# Patient Record
Sex: MALE
[De-identification: names, ages, dates, MRNs, and addresses within clinical notes are randomized per-mention and may not be internally consistent; named-entity substitution may affect disease eponyms.]

---

## 2023-02-14 PROBLEM — Z00.00 ENCOUNTER FOR PREVENTIVE HEALTH EXAMINATION: Status: ACTIVE | Noted: 2023-02-14

## 2023-03-03 ENCOUNTER — APPOINTMENT (OUTPATIENT)
Dept: UROLOGY | Facility: CLINIC | Age: 68
End: 2023-03-03
Payer: MEDICARE

## 2023-03-03 VITALS
HEART RATE: 72 BPM | RESPIRATION RATE: 16 BRPM | DIASTOLIC BLOOD PRESSURE: 78 MMHG | TEMPERATURE: 97.5 F | SYSTOLIC BLOOD PRESSURE: 147 MMHG | OXYGEN SATURATION: 98 % | WEIGHT: 178 LBS

## 2023-03-03 PROCEDURE — 99203 OFFICE O/P NEW LOW 30 MIN: CPT

## 2023-03-03 NOTE — PHYSICAL EXAM
[General Appearance - Well Developed] : well developed [General Appearance - Well Nourished] : well nourished [General Appearance - In No Acute Distress] : no acute distress [Edema] : no peripheral edema [Exaggerated Use Of Accessory Muscles For Inspiration] : no accessory muscle use [Abdomen Soft] : soft [Abdomen Tenderness] : non-tender [Costovertebral Angle Tenderness] : no ~M costovertebral angle tenderness [Normal Station and Gait] : the gait and station were normal for the patient's age [No Focal Deficits] : no focal deficits [Oriented To Time, Place, And Person] : oriented to person, place, and time

## 2023-03-03 NOTE — ASSESSMENT
[FreeTextEntry1] : BPH\par --COnt alfuzosin\par \par 5-7mm LP stone. No sx. Discussed surgery vs observation. Recommend obsrevation but pt very anxious about this. Discussed that last KUB did not visualize stone making SWL less likely to be an option. \par --KUB eval.

## 2023-03-03 NOTE — HISTORY OF PRESENT ILLNESS
[FreeTextEntry1] : 66yo gentleman with cc of stones and luts. Previously seen by Dr Abarca. Was placed on alfuzosin. Had been noted to have microscopic hematuria and had CTU with 7mm stone. Had cysto 12/2022 that was negative. Last note in [facility] states stone seen on [facility]. Review of records with [facility] 1/2023 negative. Plan made for [facility] but this was not covered and so pt was sent here. \par \par No personal or family hx of stones. Drinks occasional juice and soda and a lot of water. Used to drink a lot more soda. Drinks 1 cup of coffee in am and 1 in the afternoon. No special diet. \par \par Use of  #[id number]

## 2023-03-10 ENCOUNTER — APPOINTMENT (OUTPATIENT)
Dept: UROLOGY | Facility: CLINIC | Age: 68
End: 2023-03-10
Payer: MEDICARE

## 2023-03-10 DIAGNOSIS — N40.1 BENIGN PROSTATIC HYPERPLASIA WITH LOWER URINARY TRACT SYMPMS: ICD-10-CM

## 2023-03-10 DIAGNOSIS — N20.0 CALCULUS OF KIDNEY: ICD-10-CM

## 2023-03-10 DIAGNOSIS — N13.8 BENIGN PROSTATIC HYPERPLASIA WITH LOWER URINARY TRACT SYMPMS: ICD-10-CM

## 2023-03-10 PROCEDURE — 99214 OFFICE O/P EST MOD 30 MIN: CPT

## 2023-03-10 NOTE — HISTORY OF PRESENT ILLNESS
[FreeTextEntry1] : 66yo gentleman with cc of stones and luts. Previously seen by Dr Abarca. Was placed on alfuzosin. Had been noted to have microscopic hematuria and had CTU with 7mm stone. Had cysto 12/2022 that was negative. Last note in [facility] states stone seen on [facility]. Review of records with [facility] 1/2023 negative. Plan made for ESW[facility] but this was not covered and so pt was sent here. Discussed recommendation of observation but pt felt he had prior plan in place and wanted to proceed. Discussed stone not seen on most recent KUB. This was repeated and again not seen. \par \par No personal or family hx of stones. Drinks occasional juice and soda and a lot of water. Used to drink a lot more soda. Drinks 1 cup of coffee in am and 1 in the afternoon. No special diet. \par \par Use of  #[id number]

## 2023-03-10 NOTE — PHYSICAL EXAM
[General Appearance - Well Developed] : well developed [General Appearance - Well Nourished] : well nourished [General Appearance - In No Acute Distress] : no acute distress [Exaggerated Use Of Accessory Muscles For Inspiration] : no accessory muscle use [Oriented To Time, Place, And Person] : oriented to person, place, and time [Normal Station and Gait] : the gait and station were normal for the patient's age

## 2023-03-10 NOTE — ASSESSMENT
[FreeTextEntry1] : BPH\par --COnt alfuzosin\par \par 5-7mm LP stone. No sx. Discussed that last KUB did not visualize stone making SWL less likely to be an option. Again not seen. Suspect calcium poor stone. Recommend observation. Discussed URS as pt concerned about issues related to stone. Discussed risks which include but are not limited to bleeding, infection, injury to bladder/ureter/kidney, and need for additional procedures. However, he will be traveling back to [facility] for 7mo. As such, will plan on observation at least until he comes back. \par --Renal US in Feb

## 2023-03-13 LAB
APPEARANCE: CLEAR
BACTERIA UR CULT: NORMAL
BACTERIA: NEGATIVE
BILIRUBIN URINE: NEGATIVE
BLOOD URINE: NEGATIVE
CALCIUM OXALATE CRYSTALS: ABNORMAL
COLOR: YELLOW
GLUCOSE QUALITATIVE U: NEGATIVE
HYALINE CASTS: 0 /LPF
KETONES URINE: NEGATIVE
LEUKOCYTE ESTERASE URINE: NEGATIVE
MICROSCOPIC-UA: NORMAL
NITRITE URINE: NEGATIVE
PH URINE: 6.5
PROTEIN URINE: NORMAL
RED BLOOD CELLS URINE: 1 /HPF
SPECIFIC GRAVITY URINE: 1.02
SQUAMOUS EPITHELIAL CELLS: 0 /HPF
UROBILINOGEN URINE: NORMAL
WHITE BLOOD CELLS URINE: 2 /HPF